# Patient Record
(demographics unavailable — no encounter records)

---

## 2025-06-04 NOTE — PHYSICAL EXAM
[Well Developed] : well developed [Well Nourished] : well nourished [No Acute Distress] : no acute distress [Normal Conjunctiva] : normal conjunctiva [Normal Venous Pressure] : normal venous pressure [No Carotid Bruit] : no carotid bruit [Normal S1, S2] : normal S1, S2 [No Rub] : no rub [No Gallop] : no gallop [I] : a grade 1 [Clear Lung Fields] : clear lung fields [Good Air Entry] : good air entry [No Respiratory Distress] : no respiratory distress  [Soft] : abdomen soft [Non Tender] : non-tender [No Masses/organomegaly] : no masses/organomegaly [Normal Bowel Sounds] : normal bowel sounds [Normal Gait] : normal gait , GCS 15 [No Edema] : no edema [No Cyanosis] : no cyanosis [No Clubbing] : no clubbing [No Varicosities] : no varicosities [No Rash] : no rash [No Skin Lesions] : no skin lesions [Moves all extremities] : moves all extremities [No Focal Deficits] : no focal deficits [Normal Speech] : normal speech [Alert and Oriented] : alert and oriented [Normal memory] : normal memory GEN: Alert, confused. HEENT: Throat clear. Head NC/AT. NECK: Supple, No JVD. FROM. C-spine non-tender. CV:S1S2, RRR, LUNGS: CTA b/l, no w/r/r. ABD: Soft, NT/ND, no rebound, no guarding. No CVAT. EXT: No e/c/c. 2+ distal pulses. SKIN: No rashes. NEURO: No focal deficits.   Francisco Garcia PA

## 2025-06-04 NOTE — DISCUSSION/SUMMARY
[FreeTextEntry1] : Scot has a history of bicuspid aortic stenosis for which he had a recent transcatheter aortic valve replacement.  He is doing very well.    I reviewed his preprocedure echo as well as his postprocedural echo.  I reviewed his cardiac catheterization report.  I reviewed his cardiac CT and serial EKGs.  Echocardiography was performed December 21, 2023.  This revealed a normal ejection fraction with mild mitral regurgitation.  The transcatheter aortic valve replacement was functioning normally, with trace paravalvular regurgitation.  I have suggested a follow-up echocardiogram to reevaluate the status of his transcatheter aortic valve.  I be in contact to discuss the results.  We discussed his lifestyle and his diet at length.  If all is well, he will see me in a year. [EKG obtained to assist in diagnosis and management of assessed problem(s)] : EKG obtained to assist in diagnosis and management of assessed problem(s)

## 2025-06-04 NOTE — CARDIOLOGY SUMMARY
[de-identified] : January 17, 2023 normal sinus rhythm April 19, 2023 normal sinus rhythm December 18, 2023 normal sinus rhythm

## 2025-06-04 NOTE — HISTORY OF PRESENT ILLNESS
[FreeTextEntry1] : Scot presented to the office today for a cardiovascular evaluation.  He was last seen in the office in December, 2023.  He is now 71 years old, with a history of bicuspid aortic valve disease.  He has a history of Hodgkin's lymphoma, treated in the remote past, in the 1980s.  He has hypertension.  He has a history of a remote right lower extremity DVT, secondary to trauma sustained while playing basketball, over 20 years ago for which he was on warfarin for 2 years.  He has a history of hypercholesterolemia.  He developed progressive aortic stenosis, and underwent transcatheter aortic valve replacement on January 5, 2023.  The procedure was uncomplicated.  I saw him in the office in January 2023, after his transcatheter valve was implanted.  He was doing well at that time.  His blood pressure was elevated, and I asked him to check it at home.  Despite watching his diet, and exercising, his blood pressure was elevated, and I started him on amlodipine 5 mg daily.  A month later it was increased up to 10 mg daily.  I saw him in April 2023, and he was not feeling well on the amlodipine.  I reduced the amlodipine from 10 down to 5 mg daily, and I started valsartan.  He was planned for blood work.  His blood work revealed stable electrolytes.  His blood pressure remained elevated, and valsartan was further increased in May 2023. I saw him in December, 2023 and he was feeling well.  I recommended a repeat echocardiogram.  Echocardiography was performed December 21, 2023.  This revealed a normal ejection fraction with mild mitral regurgitation.  The transcatheter aortic valve replacement was functioning normally, with trace paravalvular regurgitation.  He presents to the office today having been feeling well from a cardiovascular perspective.  He reports no chest discomfort or shortness of breath with activity.  He denies orthopnea and PND.  He denies palpitations, dizziness and syncope.   His blood pressure has been very well-controlled, with an average systolic in the range of 120, and diastolics in the 50s. Last year, he was discovered to be mildly anemic, and it was recommended that he have a colonoscopy. He has not done so. His A1C has been high and he has been cutting out sugar.